# Patient Record
Sex: MALE | Race: WHITE | NOT HISPANIC OR LATINO | Employment: FULL TIME | URBAN - METROPOLITAN AREA
[De-identification: names, ages, dates, MRNs, and addresses within clinical notes are randomized per-mention and may not be internally consistent; named-entity substitution may affect disease eponyms.]

---

## 2023-03-21 LAB
CHOLESTEROL (MG/DL) IN SER/PLAS: 126 MG/DL (ref 0–199)
CHOLESTEROL IN HDL (MG/DL) IN SER/PLAS: 68.7 MG/DL
CHOLESTEROL/HDL RATIO: 1.8
NON-HDL CHOLESTEROL: 57 MG/DL (ref 0–149)

## 2023-10-29 PROBLEM — F90.2 ADHD (ATTENTION DEFICIT HYPERACTIVITY DISORDER), COMBINED TYPE: Status: ACTIVE | Noted: 2023-10-29

## 2023-10-29 PROBLEM — E10.9 TYPE 1 DIABETES MELLITUS (MULTI): Status: ACTIVE | Noted: 2023-10-29

## 2023-10-29 PROBLEM — Z96.41 INSULIN PUMP IN PLACE: Status: ACTIVE | Noted: 2023-10-29

## 2023-10-29 PROBLEM — R89.4 ABNORMAL CELIAC ANTIBODY PANEL: Status: ACTIVE | Noted: 2023-10-29

## 2023-10-29 PROBLEM — Z91.89 AT RISK FOR GENETIC DISORDER: Status: ACTIVE | Noted: 2023-10-29

## 2023-10-29 PROBLEM — Z77.011 LEAD EXPOSURE: Status: ACTIVE | Noted: 2023-10-29

## 2023-10-29 RX ORDER — CALCIUM CARB/VITAMIN D3/VIT K1 500-100-40
TABLET,CHEWABLE ORAL AS NEEDED
COMMUNITY

## 2023-10-29 RX ORDER — IBUPROFEN 200 MG
3-4 TABLET ORAL AS NEEDED
COMMUNITY
Start: 2022-05-14

## 2023-10-29 RX ORDER — GLUCAGON 3 MG/1
POWDER NASAL
COMMUNITY
Start: 2021-09-08

## 2023-10-29 RX ORDER — INSULIN ASPART 100 [IU]/ML
INJECTION, SOLUTION INTRAVENOUS; SUBCUTANEOUS
COMMUNITY
Start: 2021-12-07 | End: 2023-10-30 | Stop reason: SDUPTHER

## 2023-10-29 RX ORDER — INSULIN GLARGINE 100 [IU]/ML
23 INJECTION, SOLUTION SUBCUTANEOUS DAILY
COMMUNITY

## 2023-10-29 RX ORDER — INSULIN GLARGINE 100 [IU]/ML
27 INJECTION, SOLUTION SUBCUTANEOUS DAILY
COMMUNITY
Start: 2018-06-18 | End: 2023-10-30 | Stop reason: SDUPTHER

## 2023-10-29 RX ORDER — BLOOD SUGAR DIAGNOSTIC
STRIP MISCELLANEOUS
COMMUNITY
Start: 2014-08-12

## 2023-10-29 RX ORDER — INSULIN ASPART 100 [IU]/ML
INJECTION, SOLUTION INTRAVENOUS; SUBCUTANEOUS
COMMUNITY
Start: 2020-07-17 | End: 2024-06-04

## 2023-10-30 ENCOUNTER — OFFICE VISIT (OUTPATIENT)
Dept: PEDIATRIC ENDOCRINOLOGY | Facility: CLINIC | Age: 23
End: 2023-10-30
Payer: COMMERCIAL

## 2023-10-30 VITALS
HEIGHT: 68 IN | BODY MASS INDEX: 22.12 KG/M2 | DIASTOLIC BLOOD PRESSURE: 67 MMHG | HEART RATE: 80 BPM | WEIGHT: 145.94 LBS | TEMPERATURE: 97 F | SYSTOLIC BLOOD PRESSURE: 124 MMHG

## 2023-10-30 DIAGNOSIS — E10.9 TYPE 1 DIABETES MELLITUS WITHOUT COMPLICATION (MULTI): Primary | ICD-10-CM

## 2023-10-30 DIAGNOSIS — E10.9 TYPE 1 DIABETES MELLITUS WITHOUT COMPLICATION (MULTI): ICD-10-CM

## 2023-10-30 DIAGNOSIS — Z23 ENCOUNTER FOR ADMINISTRATION OF VACCINE: ICD-10-CM

## 2023-10-30 LAB — POC HEMOGLOBIN A1C: 7.1 % (ref 4.2–6.5)

## 2023-10-30 PROCEDURE — 90471 IMMUNIZATION ADMIN: CPT | Performed by: PEDIATRICS

## 2023-10-30 PROCEDURE — 90686 IIV4 VACC NO PRSV 0.5 ML IM: CPT | Performed by: PEDIATRICS

## 2023-10-30 PROCEDURE — 3078F DIAST BP <80 MM HG: CPT | Performed by: PEDIATRICS

## 2023-10-30 PROCEDURE — 83036 HEMOGLOBIN GLYCOSYLATED A1C: CPT | Performed by: PEDIATRICS

## 2023-10-30 PROCEDURE — 99214 OFFICE O/P EST MOD 30 MIN: CPT | Performed by: PEDIATRICS

## 2023-10-30 PROCEDURE — 3074F SYST BP LT 130 MM HG: CPT | Performed by: PEDIATRICS

## 2023-10-30 PROCEDURE — 95251 CONT GLUC MNTR ANALYSIS I&R: CPT | Performed by: PEDIATRICS

## 2023-10-30 ASSESSMENT — ENCOUNTER SYMPTOMS
ACTIVITY CHANGE: 0
HEADACHES: 0
APPETITE CHANGE: 0
ABDOMINAL PAIN: 0

## 2023-10-30 NOTE — PROGRESS NOTES
Subjective   Troy Lea is a 23 y.o. male with type 1 diabetes.   Today Troy presents to clinic by himself    Troy is a 23 year old male with type 1 diabetes diagnosed in October 2003    Manages diabetes with Tandem with Dexcom G6    Concerns at this visit:  -none    Social:  -living with 2 roommates, working as a  at [employer]    Screens:  Eye exam:  due  Labs: due  Flu shot: would be interested    Insulin Injections/Pump sites:  - Gives mealtime insulin during eating  - Site rotation: thighs, abdomen    Infusion set: autosoft 90, changing every 3 days    Carbohydrate counting:  - Patient states they are good at counting carbs  - Patient states they are good at adherence to bolusing for carbs    Other:  Hypoglyemia:  - uses soda, candy to treat lows  - treats with 20-30 gms carbs  - Nocturnal hypoglycemia: yes   Checks ketones >300 for over an hour    Exercise: 7 days of running    CGM Type: Descom G6  Average Glucose: 153mg/dL  SD:  High (>180): 29%  In Range (): 70%  Low (<70): 1%    TDD: 35.67 units  Basal: 64% (22.69 units)  Bolus: 36%   Carbs/Day:     Education Reviewed: treatment of lows    Diabetes  He presents for his follow-up diabetic visit. He has type 1 diabetes mellitus. No MedicAlert identification noted. Pertinent negatives for hypoglycemia include no headaches.        Goals        Help patients manage type 1 diabetes      Bolus for carbs!              Date of Diabetes Diagnosis: 10/01/03  CGM Type: Dexcom G6  Time in range 70-180mg/dL (%): 70%  Time low <70mg/dL (%): 1%  ED/Hospitalizations related to Diabetes: No  ED/Hospitalization not related to Diabetes: No  ED/Hospitalization related to DKA: No  Severe Hypoglycemia (coma, seizure, disorientation, or the need for high dose glucagon) since last visit: No         Review of Systems   Constitutional:  Negative for activity change and appetite change.   Gastrointestinal:  Negative for abdominal pain.   Neurological:  Negative for  "headaches.       Objective   /67 (BP Location: Left arm, Patient Position: Sitting, BP Cuff Size: Adult)   Pulse 80   Temp 36.1 °C (97 °F) (Temporal)   Ht 1.715 m (5' 7.52\")   Wt 66.2 kg (145 lb 15.1 oz)   BMI 22.51 kg/m²      Lab  POC HEMOGLOBIN A1c   Date Value Ref Range Status   10/30/2023 7.1 (A) 4.2 - 6.5 % Final       Physical Exam   General: interactive in NAD  Injection/pump/sensor sites: no hypertrophies, no bruising or signs of infection or allergic reaction  Fundi:   Neck: No lymphadenopathy  Heart:   Chest/Lungs:   Abdomen: Soft, non-tender, no HSmegaly or masses  Neuro: Grossly Intact  Extremities: normal,  Feet: normal   Thyroid: normal       Enlargement: not enlarged       Consistency: soft       Surface: smooth  Sexual Development: mature      Assessment/Plan   A 23 y.o. male with S2Vyjeloiw since 2003 treated with tandem insulin pump with CI settings  and has not developed any diabetes complications thus far.   A1C is close to target at 7.1%     BP is normal, weight is stable.   Insulin pump / sensor/ meter reports were reviewed for patterns and insulin dose adjustments were made (see insulin instructions).     Patient is due for annual surveillance tests which were ordered.  Patient is needs to schedule an eye exam.    Topics reviewed:  - importance of prebolusing   - benefits of physical activity using activity/exercise mode   - transition to adult care  Follow up in 3 mos with adult diabetes practice       Problem List Items Addressed This Visit       Type 1 diabetes mellitus (CMS/HCC) - Primary    Relevant Orders    POCT glycosylated hemoglobin (Hb A1C) manually resulted    Lipid Panel Non-Fasting    TSH with reflex to Free T4 if abnormal    Comprehensive Metabolic Panel    Albumin , Urine Random    Tissue Transglutaminase IgA     Other Visit Diagnoses       Encounter for administration of vaccine        Relevant Orders    Flu vaccine (IIV4) age 3 years and greater, preservative free " (Completed)            Plan  - loosen your correction ratio   Do your best to bolus for carbs  Schedule an eye exam   Please do your annual blood tests  It is time to transition your to adult diabetes practice:  Please call  [phone] to schedule an appt  -we recommend   Yocasta Chan PA-C     Insulin Instructions  Tandem CIQ   insulin aspart 100 unit/mL injection (NovoLOG)   Last edited by Tri Domingo RN on 10/30/2023 at 2:58 PM      Duration of insulin action:   5 hours      Basal Rate   Total Basal Dose: 23.1 units/day   Time units/hr   12:00 AM 0.8    3:00 AM 0.8    6:00 AM 0.8   12:00 PM 1.1    6:00 PM 1.2    9:00 PM 1.1      Blood Glucose Target   Time mg/dL   12:00  - 130    3:00  - 130    6:00  - 110   12:00  - 110    6:00  - 110    9:00  - 130      Sensitivity Factor   Time mg/dL/unit   12:00 AM 35    3:00 AM 35    6:00 AM 35   12:00 PM 35    6:00 PM 35    9:00 PM 35      Carb Ratio   Time g/unit   12:00 AM 7    3:00 AM 7    6:00 AM 7   12:00 PM 7    6:00 PM 5    9:00 PM 7       CGM Interpretation  CGM report was reviewed with family, download scanned into EMR see above for statistics. There is pattern of intermittent hypoglycemic after control IQ boluses      CGM Plan  Will loosen up ICF ratios across the board

## 2023-10-30 NOTE — PATIENT INSTRUCTIONS
It was nice to meet you today Troy! Your HbA1C is 7.1%!  We will loosen your correction ratio   Do your best to bolus for carbs  Schedule an eye exam   Please do your annual blood tests  It is time to transition your to adult diabetes practice:  Please call  [phone] to schedule an appt  -we recommend   Yocasta Chan PA-C

## 2023-10-31 DIAGNOSIS — E10.9 TYPE 1 DIABETES MELLITUS WITHOUT COMPLICATION (MULTI): ICD-10-CM

## 2023-10-31 RX ORDER — INSULIN GLARGINE 100 [IU]/ML
INJECTION, SOLUTION SUBCUTANEOUS
Qty: 15 ML | Refills: 1 | Status: SHIPPED | OUTPATIENT
Start: 2023-10-31 | End: 2023-11-01

## 2023-10-31 RX ORDER — INSULIN ASPART 100 [IU]/ML
INJECTION, SOLUTION INTRAVENOUS; SUBCUTANEOUS
Qty: 15 ML | Refills: 1 | Status: SHIPPED | OUTPATIENT
Start: 2023-10-31

## 2023-11-01 RX ORDER — INSULIN GLARGINE 100 [IU]/ML
INJECTION, SOLUTION SUBCUTANEOUS
Qty: 15 ML | Refills: 1 | Status: SHIPPED | OUTPATIENT
Start: 2023-11-01

## 2023-11-20 ENCOUNTER — OFFICE VISIT (OUTPATIENT)
Dept: URGENT CARE | Facility: CLINIC | Age: 23
End: 2023-11-20
Payer: COMMERCIAL

## 2023-11-20 VITALS
HEART RATE: 94 BPM | SYSTOLIC BLOOD PRESSURE: 126 MMHG | DIASTOLIC BLOOD PRESSURE: 62 MMHG | TEMPERATURE: 97.8 F | BODY MASS INDEX: 22.36 KG/M2 | WEIGHT: 145 LBS | OXYGEN SATURATION: 95 %

## 2023-11-20 DIAGNOSIS — S20.211A CONTUSION OF RIGHT CHEST WALL, INITIAL ENCOUNTER: Primary | ICD-10-CM

## 2023-11-20 PROCEDURE — 3074F SYST BP LT 130 MM HG: CPT | Performed by: PHYSICIAN ASSISTANT

## 2023-11-20 PROCEDURE — 1036F TOBACCO NON-USER: CPT | Performed by: PHYSICIAN ASSISTANT

## 2023-11-20 PROCEDURE — 3078F DIAST BP <80 MM HG: CPT | Performed by: PHYSICIAN ASSISTANT

## 2023-11-20 PROCEDURE — 99203 OFFICE O/P NEW LOW 30 MIN: CPT | Performed by: PHYSICIAN ASSISTANT

## 2023-11-20 RX ORDER — LIDOCAINE 50 MG/G
1 PATCH TOPICAL DAILY
Qty: 10 PATCH | Refills: 0 | Status: SHIPPED | OUTPATIENT
Start: 2023-11-20 | End: 2023-11-30

## 2023-11-20 RX ORDER — PREDNISONE 20 MG/1
20 TABLET ORAL 2 TIMES DAILY
Qty: 6 TABLET | Refills: 0 | Status: SHIPPED | OUTPATIENT
Start: 2023-11-20 | End: 2023-11-23

## 2023-11-25 PROBLEM — S20.211A CONTUSION OF RIGHT CHEST WALL: Status: ACTIVE | Noted: 2023-11-25

## 2023-11-25 NOTE — PROGRESS NOTES
Subjective   Patient ID: Troy Lea is a 23 y.o. male.    Patient is a 23-year-old male who complains of right anterior chest wall pain after sustaining a fall injury 2 days ago.  Patient reports that he was chasing his dog when he fell to the ground onto his right chest wall attempting to crawl over a fence.  Patient reports of continuing pain to the inferior aspect of his right anterior chest.  Patient denies dyspnea or shortness of breath.  Patient reports that his pain is aggravated with deep inspiration and expiration as well as cough.  Patient denies pain or injury to his left anterior chest as well as his posterior thorax.  Patient clearly denies pain or injury to his abdominal right upper quadrant.  Patient has no additional complaints of pain or injury at the present time.      The following portions of the chart were reviewed this encounter and updated as appropriate:       Review of Systems   Cardiovascular:  Positive for chest pain.        Pain to Right Anterior Chest Wall   All other systems reviewed and are negative.    Objective   Physical Exam  Vitals and nursing note reviewed.   Constitutional:       Appearance: Normal appearance. He is normal weight.   HENT:      Head: Normocephalic and atraumatic.      Right Ear: External ear normal.      Left Ear: External ear normal.      Nose: Nose normal. No congestion or rhinorrhea.      Mouth/Throat:      Mouth: Mucous membranes are moist.      Pharynx: Oropharynx is clear. No oropharyngeal exudate or posterior oropharyngeal erythema.   Eyes:      Extraocular Movements: Extraocular movements intact.      Conjunctiva/sclera: Conjunctivae normal.      Pupils: Pupils are equal, round, and reactive to light.   Cardiovascular:      Rate and Rhythm: Normal rate and regular rhythm.      Pulses: Normal pulses.      Heart sounds: Normal heart sounds. No murmur heard.     No friction rub. No gallop.   Pulmonary:      Effort: Pulmonary effort is normal. No respiratory  distress.      Breath sounds: Normal breath sounds. No stridor. No wheezing, rhonchi or rales.   Musculoskeletal:         General: Tenderness present. No swelling, deformity or signs of injury. Normal range of motion.      Cervical back: Normal range of motion and neck supple.      Comments: Clearly reproducible tenderness with palpation to the inferior aspect of the anterior right chest.  There is no crepitus or deformity noted with palpation.  Overlying skin is clear without erythema or ecchymosis.  Respiratory effort is relaxed and chest movement is symmetric.  Pulse oximeter saturation is 95% on room air and the patient is speaking in complete sentences.  There is no tenderness with palpation to the right upper quadrant of the abdomen.   Skin:     General: Skin is warm and dry.      Capillary Refill: Capillary refill takes less than 2 seconds.      Findings: No bruising or erythema.   Neurological:      General: No focal deficit present.      Mental Status: He is alert and oriented to person, place, and time.   Psychiatric:         Mood and Affect: Mood normal.         Behavior: Behavior normal.         Thought Content: Thought content normal.         Judgment: Judgment normal.     Assessment/Plan   Physical exam findings as noted above.  X-ray right ribs with PA chest is negative for acute findings as reported by the radiologist.  Patient was provided with prescriptions for prednisone 20 mg and Lidoderm 5% patches.  Patient was advised to report to an emergency department if he notes any acute worsening of his symptoms to include dyspnea or shortness of breath.  Patient verbalizes clear understanding of the above instructions.    CLINICAL IMPRESSION:  Contusion Right Anterior Chest Wall    Diagnoses and all orders for this visit:  Contusion of right chest wall, initial encounter  -     XR ribs right 2 views w chest anteroposterior; Future  -     predniSONE (Deltasone) 20 mg tablet; Take 1 tablet (20 mg) by  mouth 2 times a day for 3 days.  -     lidocaine (Lidoderm) 5 % patch; Place 1 patch over 12 hours on the skin once daily for 10 days. Remove & discard patch within 12 hours or as directed by MD.

## 2024-04-01 ENCOUNTER — OFFICE VISIT (OUTPATIENT)
Dept: PEDIATRIC ENDOCRINOLOGY | Facility: CLINIC | Age: 24
End: 2024-04-01
Payer: COMMERCIAL

## 2024-04-01 ENCOUNTER — LAB (OUTPATIENT)
Dept: LAB | Facility: LAB | Age: 24
End: 2024-04-01
Payer: COMMERCIAL

## 2024-04-01 VITALS
WEIGHT: 143.8 LBS | HEIGHT: 67 IN | HEART RATE: 64 BPM | TEMPERATURE: 97.5 F | SYSTOLIC BLOOD PRESSURE: 113 MMHG | DIASTOLIC BLOOD PRESSURE: 68 MMHG | BODY MASS INDEX: 22.57 KG/M2

## 2024-04-01 DIAGNOSIS — E10.9 TYPE 1 DIABETES MELLITUS WITHOUT COMPLICATION (MULTI): ICD-10-CM

## 2024-04-01 LAB
ALBUMIN SERPL BCP-MCNC: 4.4 G/DL (ref 3.4–5)
ALP SERPL-CCNC: 45 U/L (ref 33–120)
ALT SERPL W P-5'-P-CCNC: 11 U/L (ref 10–52)
ANION GAP SERPL CALC-SCNC: 14 MMOL/L (ref 10–20)
AST SERPL W P-5'-P-CCNC: 15 U/L (ref 9–39)
BILIRUB SERPL-MCNC: 1 MG/DL (ref 0–1.2)
BUN SERPL-MCNC: 12 MG/DL (ref 6–23)
CALCIUM SERPL-MCNC: 9.7 MG/DL (ref 8.6–10.6)
CHLORIDE SERPL-SCNC: 102 MMOL/L (ref 98–107)
CHOLEST SERPL-MCNC: 139 MG/DL (ref 0–199)
CHOLESTEROL/HDL RATIO: 1.9
CO2 SERPL-SCNC: 30 MMOL/L (ref 21–32)
CREAT SERPL-MCNC: 0.93 MG/DL (ref 0.5–1.3)
CREAT UR-MCNC: 107.3 MG/DL (ref 20–370)
EGFRCR SERPLBLD CKD-EPI 2021: >90 ML/MIN/1.73M*2
GLUCOSE SERPL-MCNC: 129 MG/DL (ref 74–99)
HDLC SERPL-MCNC: 71.4 MG/DL
MICROALBUMIN UR-MCNC: <7 MG/L
MICROALBUMIN/CREAT UR: NORMAL MG/G{CREAT}
NON-HDL CHOLESTEROL: 68 MG/DL (ref 0–149)
POC HEMOGLOBIN A1C: 6.6 % (ref 4.2–6.5)
POTASSIUM SERPL-SCNC: 4.6 MMOL/L (ref 3.5–5.3)
PROT SERPL-MCNC: 6.9 G/DL (ref 6.4–8.2)
SODIUM SERPL-SCNC: 141 MMOL/L (ref 136–145)
TSH SERPL-ACNC: 2.25 MIU/L (ref 0.44–3.98)
TTG IGA SER IA-ACNC: 15.5 U/ML

## 2024-04-01 PROCEDURE — 3062F POS MACROALBUMINURIA REV: CPT | Performed by: PEDIATRICS

## 2024-04-01 PROCEDURE — 83036 HEMOGLOBIN GLYCOSYLATED A1C: CPT | Performed by: PEDIATRICS

## 2024-04-01 PROCEDURE — 36415 COLL VENOUS BLD VENIPUNCTURE: CPT

## 2024-04-01 PROCEDURE — 99214 OFFICE O/P EST MOD 30 MIN: CPT | Performed by: PEDIATRICS

## 2024-04-01 PROCEDURE — 84443 ASSAY THYROID STIM HORMONE: CPT

## 2024-04-01 PROCEDURE — 82570 ASSAY OF URINE CREATININE: CPT

## 2024-04-01 PROCEDURE — 82043 UR ALBUMIN QUANTITATIVE: CPT

## 2024-04-01 PROCEDURE — 3074F SYST BP LT 130 MM HG: CPT | Performed by: PEDIATRICS

## 2024-04-01 PROCEDURE — 3078F DIAST BP <80 MM HG: CPT | Performed by: PEDIATRICS

## 2024-04-01 PROCEDURE — 95251 CONT GLUC MNTR ANALYSIS I&R: CPT | Performed by: PEDIATRICS

## 2024-04-01 PROCEDURE — 83718 ASSAY OF LIPOPROTEIN: CPT

## 2024-04-01 PROCEDURE — 83516 IMMUNOASSAY NONANTIBODY: CPT

## 2024-04-01 PROCEDURE — 80053 COMPREHEN METABOLIC PANEL: CPT

## 2024-04-01 PROCEDURE — 82465 ASSAY BLD/SERUM CHOLESTEROL: CPT

## 2024-04-01 ASSESSMENT — PATIENT HEALTH QUESTIONNAIRE - PHQ9
1. LITTLE INTEREST OR PLEASURE IN DOING THINGS: NOT AT ALL
SUM OF ALL RESPONSES TO PHQ9 QUESTIONS 1 & 2: 0
2. FEELING DOWN, DEPRESSED OR HOPELESS: NOT AT ALL

## 2024-04-01 NOTE — PROGRESS NOTES
Subjective   Troy Lea is a 23 y.o. male with type 1 diabetes.   Today Troy presents to clinic with his mother.     HPI  Other Medical History:    appendicitis 6/23  Manages diabetes with Tandem CIQ   Dexcom G7  -TDD: 38.15 units  -Total daily basal: 23.99 unit  -Basal %: 63%  -BG average: 153   -CGM wear time (%): 84%  -Daily carb average:13g  -Insulin Instructions  Tandem CIQ   insulin aspart 100 unit/mL injection (NovoLOG)   Last edited by Natalee Hernandez RN on 4/1/2024 at 11:34 AM      Duration of insulin action:   5 hours      Basal Rate   Total Basal Dose: 22.8 units/day   Time units/hr   12:00 AM 0.8    3:00 AM 0.8    6:00 AM 0.8   12:00 PM 1.1    6:00 PM 1.1    9:00 PM 1.1      Blood Glucose Target   Time mg/dL   12:00  - 110    3:00  - 110    6:00  - 110   12:00  - 110    6:00  - 110    9:00  - 110      Sensitivity Factor   Time mg/dL/unit   12:00 AM 35    3:00 AM 35    6:00 AM 35   12:00 PM 35    6:00 PM 35    9:00 PM 35      Carb Ratio   Time g/unit   12:00 AM 7    3:00 AM 7    6:00 AM 7   12:00 PM 7    6:00 PM 6    9:00 PM 7            Concerns at this visit:    transitioning to adult  Social:    working at [employer]  Screens:  Eye exam: DUE-- 2 years ago  Labs: 4/1/24  Flu shot:10/23  Depression screen: 4/1/24     Insulin Injections/Pump sites:   - Gives mealtime insulin during  and after eating.  - Site rotation: thighs, abdomen     Carbohydrate counting:   - Patient states they are good at counting carbs.  - Patient states they are fair at adherence to bolusing for carbs.     Other:   Hypoglycemia:  - uses soda or candy to treat lows  - treats with 15+ gms carbs  - Nocturnal hypoglycemia: yes  Checks ketones with: illness or sustained high blood sugars     Exercise:   Working as a [employer], rock climbing     Education Reviewed:   Exercise mode   Goals        Help patients manage type 1 diabetes      Bolus for carbs!              Date of Diabetes Diagnosis:  "10/01/03  CGM Type: Dexcom G7  Using AID System: Yes  Boluses Per Day: 8  Time in range 70-180mg/dL (%): 67%  Time low <70mg/dL (%): 3.7% (per CGM --some lows recorded were not real)  ED/Hospitalizations related to Diabetes: No  ED/Hospitalization not related to Diabetes: No  ED/Hospitalization related to DKA: No         Review of Systems   All other systems reviewed and are negative.      Objective   /68 (BP Location: Left arm, Patient Position: Sitting)   Pulse 64   Temp 36.4 °C (97.5 °F)   Ht 1.71 m (5' 7.32\")   Wt 65.2 kg (143 lb 12.8 oz)   BMI 22.31 kg/m²      Physical Exam  Vitals reviewed. Exam conducted with a chaperone present.   Constitutional:       General: He is not in acute distress.     Appearance: Normal appearance.   HENT:      Head: Normocephalic.      Mouth/Throat:      Mouth: Mucous membranes are moist.   Eyes:      Conjunctiva/sclera: Conjunctivae normal.   Neck:      Comments: Normal thyroid, no nodules  Pulmonary:      Effort: Pulmonary effort is normal.   Skin:     General: Skin is warm.      Comments: No lipoatrophy or lipohypertrophy   Neurological:      General: No focal deficit present.      Mental Status: He is alert and oriented to person, place, and time.   Psychiatric:         Mood and Affect: Mood normal.         Behavior: Behavior normal.          Lab  Lab Results   Component Value Date    HGBA1C 6.6 (A) 04/01/2024    HGBA1C 7.1 (A) 10/30/2023    HGBA1C 8.0 (A) 09/07/2021    HGBA1C 9.0 06/18/2018       Assessment/Plan   Troy Lea is a 23 y.o. male with type 1 diabetes. HbA1c at target.  Good BP  Had annual labs drawn today.  Due for eye exam.    Glucose Monitoring: mostly in target glucoses, continue same doses.           Insulin Instructions  Tandem CIQ   insulin aspart 100 unit/mL injection (NovoLOG)   Last edited by Natalee Hernandez RN on 4/1/2024 at 11:34 AM      Duration of insulin action:   5 hours      Basal Rate   Total Basal Dose: 22.8 units/day   Time units/hr "   12:00 AM 0.8    3:00 AM 0.8    6:00 AM 0.8   12:00 PM 1.1    6:00 PM 1.1    9:00 PM 1.1      Blood Glucose Target   Time mg/dL   12:00  - 110    3:00  - 110    6:00  - 110   12:00  - 110    6:00  - 110    9:00  - 110      Sensitivity Factor   Time mg/dL/unit   12:00 AM 35    3:00 AM 35    6:00 AM 35   12:00 PM 35    6:00 PM 35    9:00 PM 35      Carb Ratio   Time g/unit   12:00 AM 7    3:00 AM 7    6:00 AM 7   12:00 PM 7    6:00 PM 6    9:00 PM 7       CGM Interpretation/Plan   14 day CGM download was reviewed in detail as documented above under GLUCOSE MONITORING and will be attached to chart.  A minimum of 72 hours of glucose data was used to inform the management plan outlined above.    Maxi Lee MD

## 2024-04-01 NOTE — PATIENT INSTRUCTIONS
HbA1c 6.6% congrats!  Schedule eye exam.  We will let you know if any of the labs are abnormal.  Good luck and let us know if you need anything.

## 2024-06-04 DIAGNOSIS — E10.9 TYPE 1 DIABETES MELLITUS WITHOUT COMPLICATION (MULTI): ICD-10-CM

## 2024-06-04 RX ORDER — INSULIN ASPART 100 [IU]/ML
INJECTION, SOLUTION INTRAVENOUS; SUBCUTANEOUS
Qty: 80 ML | Refills: 3 | Status: SHIPPED | OUTPATIENT
Start: 2024-06-04

## 2024-07-22 ENCOUNTER — APPOINTMENT (OUTPATIENT)
Dept: PEDIATRIC ENDOCRINOLOGY | Facility: CLINIC | Age: 24
End: 2024-07-22
Payer: COMMERCIAL

## 2024-10-23 ENCOUNTER — TELEPHONE (OUTPATIENT)
Dept: PEDIATRIC ENDOCRINOLOGY | Facility: HOSPITAL | Age: 24
End: 2024-10-23
Payer: COMMERCIAL

## 2025-01-17 ENCOUNTER — OFFICE VISIT (OUTPATIENT)
Dept: PEDIATRIC ENDOCRINOLOGY | Facility: CLINIC | Age: 25
End: 2025-01-17
Payer: COMMERCIAL

## 2025-01-17 ENCOUNTER — NUTRITION (OUTPATIENT)
Dept: PEDIATRIC ENDOCRINOLOGY | Facility: CLINIC | Age: 25
End: 2025-01-17

## 2025-01-17 VITALS
HEIGHT: 67 IN | HEART RATE: 82 BPM | BODY MASS INDEX: 23.53 KG/M2 | SYSTOLIC BLOOD PRESSURE: 107 MMHG | WEIGHT: 149.91 LBS | DIASTOLIC BLOOD PRESSURE: 67 MMHG

## 2025-01-17 DIAGNOSIS — E10.9 TYPE 1 DIABETES MELLITUS WITHOUT COMPLICATION: ICD-10-CM

## 2025-01-17 LAB — POC HEMOGLOBIN A1C: 6.9 % (ref 4.2–6.5)

## 2025-01-17 PROCEDURE — 1036F TOBACCO NON-USER: CPT | Performed by: PEDIATRICS

## 2025-01-17 PROCEDURE — 3078F DIAST BP <80 MM HG: CPT | Performed by: PEDIATRICS

## 2025-01-17 PROCEDURE — 95251 CONT GLUC MNTR ANALYSIS I&R: CPT | Performed by: PEDIATRICS

## 2025-01-17 PROCEDURE — 83036 HEMOGLOBIN GLYCOSYLATED A1C: CPT

## 2025-01-17 PROCEDURE — 99214 OFFICE O/P EST MOD 30 MIN: CPT | Mod: 25 | Performed by: PEDIATRICS

## 2025-01-17 PROCEDURE — 99214 OFFICE O/P EST MOD 30 MIN: CPT | Performed by: PEDIATRICS

## 2025-01-17 PROCEDURE — 3074F SYST BP LT 130 MM HG: CPT | Performed by: PEDIATRICS

## 2025-01-17 PROCEDURE — 3008F BODY MASS INDEX DOCD: CPT | Performed by: PEDIATRICS

## 2025-01-17 PROCEDURE — 83036 HEMOGLOBIN GLYCOSYLATED A1C: CPT | Mod: QW | Performed by: PEDIATRICS

## 2025-01-17 RX ORDER — GLUCAGON 3 MG/1
1 POWDER NASAL AS NEEDED
Qty: 2 EACH | Refills: 3 | Status: SHIPPED | OUTPATIENT
Start: 2025-01-17

## 2025-01-17 RX ORDER — CHLORPHENIR/PHENYLEPH/ASPIRIN 2-7.8-325
TABLET, EFFERVESCENT ORAL
Qty: 50 EACH | Refills: 11 | Status: SHIPPED | OUTPATIENT
Start: 2025-01-17 | End: 2025-01-17

## 2025-01-17 RX ORDER — INSULIN ASPART 100 [IU]/ML
INJECTION, SOLUTION INTRAVENOUS; SUBCUTANEOUS
Qty: 80 ML | Refills: 3 | Status: SHIPPED | OUTPATIENT
Start: 2025-01-17

## 2025-01-17 RX ORDER — CHLORPHENIR/PHENYLEPH/ASPIRIN 2-7.8-325
TABLET, EFFERVESCENT ORAL
Qty: 50 EACH | Refills: 11 | Status: SHIPPED | OUTPATIENT
Start: 2025-01-17

## 2025-01-17 RX ORDER — GLUCAGON 3 MG/1
1 POWDER NASAL AS NEEDED
Qty: 2 EACH | Refills: 3 | Status: SHIPPED | OUTPATIENT
Start: 2025-01-17 | End: 2025-01-17 | Stop reason: ALTCHOICE

## 2025-01-17 NOTE — PROGRESS NOTES
[facility]  [facility]    Subjective   Troy Lea is a 24 y.o. male with type 1 diabetes.   Today Troy presents to clinic with his mother.     HPI  Other Medical History:    appendicitis 2023  Manages diabetes with Tandem Control IQ and Dexcom G7     Concerns at this visit:    Transition  Social:    Working at [employer]  Insulin Injections/Pump sites:   - Gives mealtime insulin before, during, and after eating.  - Site rotation: thighs and abdomen     Carbohydrate counting:   - Patient states they are good at counting carbs.  - Patient states they are fair at adherence to bolusing for carbs.     Other:   Hypoglycemia:  - uses soda or candy  to treat lows  - treats with 15+ gms carbs  - Nocturnal hypoglycemia: yes  Checks ketones with: illness or sustained high blood sugars     Exercise:    Working as [employer]  Education Reviewed:      Goals        Help patients manage type 1 diabetes      Bolus for carbs!              Diabetes  Date of Diabetes Diagnosis: 10/01/03  Type of Diabetes: Type 1    Insulin Delivery  Diabetes Management Regimen: Pump  Pump Type: Tandem  Using AID System: Yes  Boluses Per Day (user initiated): 2  Total Daily Dose of Insulin (units): 43.45  Total Basal Insulin Per Day (units): 25.95  % Basal: 59.72  % Bolus: 40.28  Total Daily Carbs (grams): 9  Percent Automated Mode (%): 96    Glucose Monitoring  How do you primarily monitor blood sugars?: CGM  CGM Type: Dexcom G7  Time in range 70-180mg/dL (%): 63  Time low <70mg/dL (%): 0.2  Time high >180mg/dL (%): 36.8  Average Glucose: 170  Predicted GMI: 7.4    Clinical Details  Hypoglycemia Unawareness : Yes  Severe Hypoglycemia (coma, seizure, disorientation, or the need for high dose glucagon) since last visit: No    Hospitalizations (since last endocrine appointment)  ED/Hospitalizations related to Diabetes: No  ED/Hospitalization not related to Diabetes: No  ED/Hospitalization related to DKA:  "No    Education  Comprehensive Diabetes Education : 10/01/03    Screens  Eye Exam:  (DUE)  Labs: 04/01/24  Depression Screen: Yes  Depression Screen Date: 04/01/24         Review of Systems   All other systems reviewed and are negative.    Objective   /67 (BP Location: Left arm, Patient Position: Sitting, BP Cuff Size: Adult)   Pulse 82   Ht 1.707 m (5' 7.21\")   Wt 68 kg (149 lb 14.6 oz)   BMI 23.34 kg/m²      Physical Exam   General: well appearing male in no distress  HEENT: normal cephalic, atraumatic  Thyroid: non enlarged thyroid gland; no cervical lymphadenopathy  CV: RRR  Resp: non labored breathing  Abdomen: non distended  Skin: mild abdominal lipohypertrophy  Neuro: grossly normal movements  Psych: mature, happy    Lab  Lab Results   Component Value Date    HGBA1C 6.9 (A) 01/17/2025    HGBA1C 6.6 (A) 04/01/2024    HGBA1C 7.1 (A) 10/30/2023    HGBA1C 8.0 (A) 09/07/2021       Assessment/Plan   Troy Lea is a 24 y.o. male with type 1 diabetes manged with the tandem CIQ system whose A1C is 6.9%. He is ready for transition to adult care.     Glucose Monitoring: Difficulty remembering to bolus but stays in range a good amount of the time. TIR is 63%.     Plan:    Type 1 diabetes mellitus without complication  -     Refills provided  -     No dose changes  -     Referral to Ophthalmology; Future  -     Referral to Endocrinology; Future- Dr. Estephania Hsieh at     Insulin Instructions  Tandem CIQ   Last edited by Thao Marino MD on 1/17/2025 at 4:32 PM      Duration of insulin action:   5 hours      Basal Rate   Total Basal Dose: 22.8 units/day   Time units/hr   12:00 AM 0.8    3:00 AM 0.8    6:00 AM 0.8   12:00 PM 1.1    6:00 PM 1.1    9:00 PM 1.1      Blood Glucose Target   Time mg/dL   12:00  - 110    3:00  - 110    6:00  - 110   12:00  - 110    6:00  - 110    9:00  - 110      Sensitivity Factor   Time mg/dL/unit   12:00 AM 35    3:00 AM 35    6:00 AM 35 "   12:00 PM 35    6:00 PM 35    9:00 PM 35      Carb Ratio   Time g/unit   12:00 AM 7    3:00 AM 7    6:00 AM 7   12:00 PM 7    6:00 PM 6    9:00 PM 7       CGM Interpretation/Plan   14 day CGM download was reviewed in detail as documented above under GLUCOSE MONITORING and will be attached to chart.  A minimum of 72 hours of glucose data was used to inform the management plan outlined above.    Natalee Marino MD

## 2025-01-17 NOTE — LETTER
Dear Dr. Estephania Hsieh MD:    January 17, 2025    Patient: Troy Lea   YOB: 2000   Date of Visit: 1/17/2025     The enclosed Clinical Summary for New Diabetes Care Team is being sent to you for review and reference.     Thank you,      Natalee Hernandez, RN  [facility]  [address] [place]  [facility] [address]  Dept Fax: [phone]    Payor: [employer] [facility] / Plan: [employer] / Product Type: *No Product type* /     Diabetes type:  type 1    Date of Diabetes Diagnosis: 10/01/03  Pump Type: Tandem  CGM Type: Dexcom G7              Patient Active Problem List   Diagnosis   • Type 1 diabetes mellitus   • Lead exposure   • Insulin pump in place   • ADHD (attention deficit hyperactivity disorder), combined type   • Abnormal celiac antibody panel   • At risk for genetic disorder   • BMI 21.0-21.9, adult   • Contusion of right chest wall       Medication Management  Insulin Instructions  Tandem CIQ   Last edited by Thao Marino MD on 1/17/2025 at 4:32 PM      Duration of insulin action:   5 hours      Basal Rate   Total Basal Dose: 22.8 units/day   Time units/hr   12:00 AM 0.8    3:00 AM 0.8    6:00 AM 0.8   12:00 PM 1.1    6:00 PM 1.1    9:00 PM 1.1      Blood Glucose Target   Time mg/dL   12:00  - 110    3:00  - 110    6:00  - 110   12:00  - 110    6:00  - 110    9:00  - 110      Sensitivity Factor   Time mg/dL/unit   12:00 AM 35    3:00 AM 35    6:00 AM 35   12:00 PM 35    6:00 PM 35    9:00 PM 35      Carb Ratio   Time g/unit   12:00 AM 7    3:00 AM 7    6:00 AM 7   12:00 PM 7    6:00 PM 6    9:00 PM 7     Current Outpatient Medications   Medication Instructions   • acetone, urine, test (Ketone Urine Test) strip Use as needed when blood glucose is over 250 or if ill   • glucagon (Baqsimi) 3 mg/actuation spray,non-aerosol 1 spray, nasal, As needed, uSE AS DIRECTED FOR SEVERE HYPOGLYCEMIA   • glucose 4 gram chewable  "tablet 3-4 tablets, oral, As needed   • insulin aspart (NovoLOG FlexPen U-100 Insulin) 100 unit/mL (3 mL) pen inject up to 50 units daily per sliding scales at meals in case of pump failure   • insulin aspart (NovoLOG U-100 Insulin aspart) 100 unit/mL injection Inject up to 80 units daily via insulin pump   • insulin glargine (Basaglar KwikPen U-100 Insulin) 100 unit/mL (3 mL) pen Inject 23 units once daily WITH PUMP FAILURE   • insulin glargine (BASAGLAR KWIKPEN U-100 INSULIN) 23 Units, subcutaneous, Daily, DURING PUMP FAILURE    • insulin syringe-needle U-100 31G X 5/16\" 0.3 mL syringe subcutaneous, As needed, Use as instructed 4-5 INJECTIONS DAILY IN CASE PUMP FAILURE    • OneTouch Ultra Test strip test blood sugar 6-8 times daily   • pen needle, diabetic (PEN NEEDLE MISC) miscellaneous, [id number] - PEN NEEDLE MINI. TO USE WITH NOVOLOG PEN IN CASE OF PUMP FAILURE        Recent Clinical Exam/Test Results  Lab Results   Component Value Date    HGBA1C 6.9 (A) 01/17/2025    HGBA1C 6.6 (A) 04/01/2024    HGBA1C 7.1 (A) 10/30/2023    CREATININE 0.93 04/01/2024    LDLF 45 07/17/2020    HDL 71.4 04/01/2024    TRIG 33 07/17/2020    MICROALBCREA  04/01/2024      Comment:      One or more analytes used in this calculation is outside of the analytical measurement range. Calculation cannot be performed.    TSH 2.25 04/01/2024    TTGA 15.5 (H) 04/01/2024     Last Eye exam: 2022; referred to ophthalmology today  Last foot exam: n/a     /67 (BP Location: Left arm, Patient Position: Sitting, BP Cuff Size: Adult)   Pulse 82   Ht 1.707 m (5' 7.21\")   Wt 68 kg (149 lb 14.6 oz)   BMI 23.34 kg/m²     Sincerely,     KENDELL Quiñones MD        CC: No Recipients  ______________________________________________________________________________________       "

## 2025-01-17 NOTE — PROGRESS NOTES
"Reason for Nutrition Visit:  Pt is a 24 y.o. male being seen for T1DM     Past Medical Hx:  Patient Active Problem List   Diagnosis    Type 1 diabetes mellitus    Lead exposure    Insulin pump in place    ADHD (attention deficit hyperactivity disorder), combined type    Abnormal celiac antibody panel    At risk for genetic disorder    BMI 21.0-21.9, adult    Contusion of right chest wall      Anthropometrics:         1/17/2025     3:10 PM   Vitals   Systolic 107   Diastolic 67   BP Location Left arm   Heart Rate 82   Height 1.707 m (5' 7.21\")   Weight (lb) 149.91   BMI 23.34 kg/m2   BSA (m2) 1.8 m2      Weight change:    2.8 kg weight gain over 9 months    Lab Results   Component Value Date    HGBA1C 6.9 (A) 01/17/2025    CHOL 139 04/01/2024    LDLF 45 07/17/2020    TRIG 33 07/17/2020      Results for orders placed or performed in visit on 01/17/25   POCT glycosylated hemoglobin (Hb A1C) manually resulted    Collection Time: 01/17/25  3:39 PM   Result Value Ref Range    POC HEMOGLOBIN A1c 6.9 (A) 4.2 - 6.5 %     Insulin Instructions  Tandem CIQ   Last edited by Natalee Hernandez RN on 4/1/2024 at 11:34 AM      Duration of insulin action:   5 hours      Basal Rate   Total Basal Dose: 22.8 units/day   Time units/hr   12:00 AM 0.8    3:00 AM 0.8    6:00 AM 0.8   12:00 PM 1.1    6:00 PM 1.1    9:00 PM 1.1      Blood Glucose Target   Time mg/dL   12:00  - 110    3:00  - 110    6:00  - 110   12:00  - 110    6:00  - 110    9:00  - 110      Sensitivity Factor   Time mg/dL/unit   12:00 AM 35    3:00 AM 35    6:00 AM 35   12:00 PM 35    6:00 PM 35    9:00 PM 35      Carb Ratio   Time g/unit   12:00 AM 7    3:00 AM 7    6:00 AM 7   12:00 PM 7    6:00 PM 6    9:00 PM 7     Medications:   Current Outpatient Medications on File Prior to Visit   Medication Sig Dispense Refill    glucagon (Baqsimi) 3 mg/actuation spray,non-aerosol Administer into affected nostril(s).  USE AS DIRECTED FOR SEVERE " "HYPOGLYCEMIA      glucagon (Glucagen) 1 mg injection USE AS DIRECTED FOR SEVERE HYPOGLYCEMIA      glucose 4 gram chewable tablet Chew 3-4 tablets (12-16 g) if needed for low blood sugar - see comments.      insulin aspart (NovoLOG FlexPen U-100 Insulin) 100 unit/mL (3 mL) pen inject up to 50 units daily per sliding scales at meals in case of pump failure 15 mL 1    insulin glargine (Basaglar KwikPen U-100 Insulin) 100 unit/mL (3 mL) pen Inject 23 Units under the skin once daily. DURING PUMP FAILURE      insulin glargine (Basaglar KwikPen U-100 Insulin) 100 unit/mL (3 mL) pen Inject 23 units once daily WITH PUMP FAILURE 15 mL 1    insulin syringe-needle U-100 31G X 5/16\" 0.3 mL syringe Inject under the skin if needed. Use as instructed 4-5 INJECTIONS DAILY IN CASE PUMP FAILURE      NovoLOG U-100 Insulin aspart 100 unit/mL injection INJECT UP TO 80 UNITS DAILYVIA INSULIN PUMP 80 mL 3    OneTouch Ultra Test strip test blood sugar 6-8 times daily      pen needle, diabetic (PEN NEEDLE MISC) [id number] - PEN NEEDLE MINI. TO USE WITH NOVOLOG PEN IN CASE OF PUMP FAILURE       No current facility-administered medications on file prior to visit.      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  Reviewed CHO consumes and " discussed some basic CHO number he can use if not precisely CHO counting.

## 2025-01-17 NOTE — PATIENT INSTRUCTIONS
Nice to see you Troy  A1C today was 6.9%    Recommendations:   - enter carbs in your pump  - annual labs are due April 2025   - you are due for an eye exam    Follow up with adult endo provider as scheduled     Contact Information for Pediatric Endocrinology:   Daytime Number: [phone]  Night/Weekend (emergencies): [phone]  Email: [email or web address]    Please do not send my-chart messages for urgent issues

## 2025-08-01 ENCOUNTER — APPOINTMENT (OUTPATIENT)
Dept: PRIMARY CARE | Facility: CLINIC | Age: 25
End: 2025-08-01
Payer: COMMERCIAL

## 2025-08-18 ENCOUNTER — APPOINTMENT (OUTPATIENT)
Dept: PRIMARY CARE | Facility: CLINIC | Age: 25
End: 2025-08-18
Payer: COMMERCIAL

## 2025-08-18 VITALS
OXYGEN SATURATION: 98 % | HEIGHT: 67 IN | BODY MASS INDEX: 23.98 KG/M2 | WEIGHT: 152.8 LBS | HEART RATE: 80 BPM | SYSTOLIC BLOOD PRESSURE: 112 MMHG | DIASTOLIC BLOOD PRESSURE: 56 MMHG

## 2025-08-18 DIAGNOSIS — Z13.220 SCREENING FOR HYPERLIPIDEMIA: ICD-10-CM

## 2025-08-18 DIAGNOSIS — Z11.3 SCREENING FOR STD (SEXUALLY TRANSMITTED DISEASE): ICD-10-CM

## 2025-08-18 DIAGNOSIS — Z00.00 WELLNESS EXAMINATION: Primary | ICD-10-CM

## 2025-08-18 DIAGNOSIS — E10.9 TYPE 1 DIABETES MELLITUS WITHOUT COMPLICATION: ICD-10-CM

## 2025-08-18 PROCEDURE — 1036F TOBACCO NON-USER: CPT

## 2025-08-18 PROCEDURE — 3044F HG A1C LEVEL LT 7.0%: CPT

## 2025-08-18 PROCEDURE — 99385 PREV VISIT NEW AGE 18-39: CPT

## 2025-08-18 PROCEDURE — 3008F BODY MASS INDEX DOCD: CPT

## 2025-08-18 PROCEDURE — 90471 IMMUNIZATION ADMIN: CPT

## 2025-08-18 PROCEDURE — 90715 TDAP VACCINE 7 YRS/> IM: CPT

## 2025-08-18 PROCEDURE — 3074F SYST BP LT 130 MM HG: CPT

## 2025-08-18 PROCEDURE — 3078F DIAST BP <80 MM HG: CPT

## 2025-08-18 ASSESSMENT — PATIENT HEALTH QUESTIONNAIRE - PHQ9
SUM OF ALL RESPONSES TO PHQ9 QUESTIONS 1 AND 2: 0
1. LITTLE INTEREST OR PLEASURE IN DOING THINGS: NOT AT ALL
2. FEELING DOWN, DEPRESSED OR HOPELESS: NOT AT ALL

## 2026-01-05 ENCOUNTER — APPOINTMENT (OUTPATIENT)
Dept: ENDOCRINOLOGY | Facility: CLINIC | Age: 26
End: 2026-01-05
Payer: COMMERCIAL